# Patient Record
(demographics unavailable — no encounter records)

---

## 2024-11-12 NOTE — PHYSICAL EXAM
[de-identified] : Exam today reveals she is ambulating with a cane in the left hand antalgic gait more so on the right she has good motion to both hips both knees have slight restriction of full flexion mild to moderate effusions to both sides no instability on stress of the cruciate/collateral ligaments on either side she has discomfort in the medial compartment on both sides less so to the joint line.  Popliteal fossa calf neuro vas exam of both sides is negative.  X-rays ordered and taken today of both knees reveal minimal degenerative changes no loose body/lesion to either knee

## 2024-11-12 NOTE — ASSESSMENT
[FreeTextEntry1] : Impression: Internal derangement both knees.  She will continue with naproxen recently prescribed by another physician.  Physical therapy has been ordered she will return if she is not improving by the conclusion of therapy

## 2024-11-12 NOTE — HISTORY OF PRESENT ILLNESS
[de-identified] : This 56-year-old is seen today for evaluation of her knees.  She was well until approximately 10 days ago when she tripped and fell sustaining injury.  This precipitated pain swelling and stiffness.  She was seen at an emergency room and was given a "shot".  She has continued to have pain since then.  Prior to this she was doing well past history is noncontributory.  She was just recently placed on naproxen